# Patient Record
Sex: FEMALE | Race: BLACK OR AFRICAN AMERICAN | NOT HISPANIC OR LATINO | Employment: UNEMPLOYED | URBAN - METROPOLITAN AREA
[De-identification: names, ages, dates, MRNs, and addresses within clinical notes are randomized per-mention and may not be internally consistent; named-entity substitution may affect disease eponyms.]

---

## 2020-03-12 PROBLEM — I10 ESSENTIAL HYPERTENSION, BENIGN: Status: ACTIVE | Noted: 2020-03-12

## 2020-03-12 PROBLEM — F17.200 TOBACCO DEPENDENCE: Status: ACTIVE | Noted: 2020-03-12

## 2023-05-08 ENCOUNTER — HOSPITAL ENCOUNTER (OUTPATIENT)
Dept: PREADMISSION TESTING | Facility: HOSPITAL | Age: 48
Discharge: HOME | End: 2023-05-08
Attending: COLON & RECTAL SURGERY
Payer: COMMERCIAL

## 2023-05-08 DIAGNOSIS — Z12.11 SCREENING FOR COLON CANCER: ICD-10-CM

## 2023-11-20 PROBLEM — E87.6 HYPOKALEMIA: Status: ACTIVE | Noted: 2023-11-20

## 2023-12-18 ENCOUNTER — OFFICE VISIT (OUTPATIENT)
Dept: PODIATRY | Facility: CLINIC | Age: 48
End: 2023-12-18
Payer: COMMERCIAL

## 2023-12-18 VITALS — BODY MASS INDEX: 24.37 KG/M2 | WEIGHT: 129 LBS

## 2023-12-18 DIAGNOSIS — M79.672 LEFT FOOT PAIN: Primary | ICD-10-CM

## 2023-12-18 DIAGNOSIS — M21.612 BUNION OF GREAT TOE OF LEFT FOOT: ICD-10-CM

## 2023-12-18 PROCEDURE — 1160F PR REVIEW ALL MEDS BY PRESCRIBER/CLIN PHARMACIST DOCUMENTED: ICD-10-PCS | Mod: CPTII,S$GLB,, | Performed by: PODIATRIST

## 2023-12-18 PROCEDURE — 99999 PR PBB SHADOW E&M-EST. PATIENT-LVL III: ICD-10-PCS | Mod: PBBFAC,,, | Performed by: PODIATRIST

## 2023-12-18 PROCEDURE — 3061F PR NEG MICROALBUMINURIA RESULT DOCUMENTED/REVIEW: ICD-10-PCS | Mod: CPTII,S$GLB,, | Performed by: PODIATRIST

## 2023-12-18 PROCEDURE — 1160F RVW MEDS BY RX/DR IN RCRD: CPT | Mod: CPTII,S$GLB,, | Performed by: PODIATRIST

## 2023-12-18 PROCEDURE — 3008F PR BODY MASS INDEX (BMI) DOCUMENTED: ICD-10-PCS | Mod: CPTII,S$GLB,, | Performed by: PODIATRIST

## 2023-12-18 PROCEDURE — 1159F MED LIST DOCD IN RCRD: CPT | Mod: CPTII,S$GLB,, | Performed by: PODIATRIST

## 2023-12-18 PROCEDURE — 3066F NEPHROPATHY DOC TX: CPT | Mod: CPTII,S$GLB,, | Performed by: PODIATRIST

## 2023-12-18 PROCEDURE — 99999 PR PBB SHADOW E&M-EST. PATIENT-LVL III: CPT | Mod: PBBFAC,,, | Performed by: PODIATRIST

## 2023-12-18 PROCEDURE — 99203 PR OFFICE/OUTPT VISIT, NEW, LEVL III, 30-44 MIN: ICD-10-PCS | Mod: S$GLB,,, | Performed by: PODIATRIST

## 2023-12-18 PROCEDURE — 1159F PR MEDICATION LIST DOCUMENTED IN MEDICAL RECORD: ICD-10-PCS | Mod: CPTII,S$GLB,, | Performed by: PODIATRIST

## 2023-12-18 PROCEDURE — 3008F BODY MASS INDEX DOCD: CPT | Mod: CPTII,S$GLB,, | Performed by: PODIATRIST

## 2023-12-18 PROCEDURE — 3066F PR DOCUMENTATION OF TREATMENT FOR NEPHROPATHY: ICD-10-PCS | Mod: CPTII,S$GLB,, | Performed by: PODIATRIST

## 2023-12-18 PROCEDURE — 99203 OFFICE O/P NEW LOW 30 MIN: CPT | Mod: S$GLB,,, | Performed by: PODIATRIST

## 2023-12-18 PROCEDURE — 3061F NEG MICROALBUMINURIA REV: CPT | Mod: CPTII,S$GLB,, | Performed by: PODIATRIST

## 2023-12-18 NOTE — PROGRESS NOTES
Subjective:       Patient ID: Imelda Gooden is a 48 y.o. female.    Chief Complaint: Foot Pain (Left foot pain, pt was last seen by Adelia Ricardo MD at 2023, rate pain 5/10, nondiabetic, pt is wearing tennis shoes and socks)      HPI: Imelda Gooden presents to the office today with complaints of mild pains to the left foot at the great toe due to bunion deformity and the bottom of the left foot. Patient has not had injection therapy to the 1st MTPJ on the affected limb prior. Patient has had discomfort to the great toe for the past several months but reporting 0/10 pain today. Patient's Primary Care Provider is Adelia Buchanan MD.     Review of patient's allergies indicates:  No Known Allergies    History reviewed. No pertinent past medical history.    Family History   Problem Relation Age of Onset    Kidney disease Mother     Heart attack Mother     No Known Problems Father     No Known Problems Brother     No Known Problems Maternal Aunt     No Known Problems Maternal Uncle     No Known Problems Paternal Aunt     No Known Problems Paternal Uncle     No Known Problems Maternal Grandmother     No Known Problems Maternal Grandfather     No Known Problems Paternal Grandmother        Social History     Socioeconomic History    Marital status: Single   Tobacco Use    Smoking status: Some Days    Smokeless tobacco: Current   Substance and Sexual Activity    Alcohol use: Yes    Drug use: Never    Sexual activity: Yes       Past Surgical History:   Procedure Laterality Date     SECTION      3 c-sections    OOPHORECTOMY         Review of Systems    Objective:   Wt 58.5 kg (129 lb)   LMP 2023 (Approximate)   BMI 24.37 kg/m²     No image results found.       Physical Exam  LOWER EXTREMITY PHYSICAL EXAMINATION    VASCULAR: On the left foot, the dorsalis pedis pulse is 2/4 and the posterior tibial pulse is 2/4. Capillary refill time is less than 3 seconds. Hair growth is  present on the dorsum of the foot and at the digits. Proximal to distal temperature is warm to warm    ORTHOPEDIC: Mild bunion deformity is noted to the left foot. Upon ROM in the sagittal plan, there is minimal pain related at the end ROM, dorsally; no plantar pains at the 1st MTPJ are stated. No pains to palpation of the tibial or the fibular sesamoid. There is a moderate medial eminence appreciated. There is no dorsal spurring noted. Pain sub 3rd and 4th metatarsal head.     DERMATOLOGY: Skin is supple, dry and intact. No ecchymosis is noted. No ulcerations are noted. Skin is supple and moist.     NEUROLOGY: Protective sensation is intact via 5.07 Hartline Masood monofilament. Proprioception is intact. Sensation to light touch is intact.     Assessment:     1. Left foot pain    2. Bunion of great toe of left foot        Plan:     Left foot pain  -     Ambulatory referral/consult to Podiatry    Bunion of great toe of left foot      Thorough discussion is had with the patient today, concerning the diagnosis, its etiology, and the treatment algorithm at present.    Patient did have previous x-rays perform an outside clinic.  Unable to evaluate the x-ray imaging, however the results were reviewed to show no acute fracture, dislocation, arthritis.    We discussed utilization of wide toe box shoe gear to help provide adequate room in spacing to the left foot to decrease risk of pain associated with the medial eminence as well as potential neuroma to the plantar aspect the left foot.  As patient is having little to no pain today, would recommend as she continues to function and ambulate as tolerated.  Would not recommend surgical intervention if it is not hurting or causing any issues.      No future appointments.

## 2025-04-10 ENCOUNTER — OFFICE VISIT (OUTPATIENT)
Dept: OBSTETRICS AND GYNECOLOGY | Facility: CLINIC | Age: 50
End: 2025-04-10
Payer: COMMERCIAL

## 2025-04-10 ENCOUNTER — LAB VISIT (OUTPATIENT)
Dept: LAB | Facility: HOSPITAL | Age: 50
End: 2025-04-10
Payer: COMMERCIAL

## 2025-04-10 VITALS — BODY MASS INDEX: 26.19 KG/M2 | WEIGHT: 138.69 LBS | HEIGHT: 61 IN

## 2025-04-10 DIAGNOSIS — Z01.419 ENCOUNTER FOR ANNUAL ROUTINE GYNECOLOGICAL EXAMINATION: ICD-10-CM

## 2025-04-10 DIAGNOSIS — Z12.4 CERVICAL CANCER SCREENING: ICD-10-CM

## 2025-04-10 DIAGNOSIS — N95.0 PMB (POSTMENOPAUSAL BLEEDING): ICD-10-CM

## 2025-04-10 LAB
FSH SERPL-ACNC: 113.38 MIU/ML
TSH SERPL-ACNC: 0.65 UIU/ML (ref 0.4–4)

## 2025-04-10 PROCEDURE — 84443 ASSAY THYROID STIM HORMONE: CPT

## 2025-04-10 PROCEDURE — 85025 COMPLETE CBC W/AUTO DIFF WBC: CPT

## 2025-04-10 PROCEDURE — 87624 HPV HI-RISK TYP POOLED RSLT: CPT

## 2025-04-10 PROCEDURE — 88175 CYTOPATH C/V AUTO FLUID REDO: CPT

## 2025-04-10 PROCEDURE — 36415 COLL VENOUS BLD VENIPUNCTURE: CPT

## 2025-04-10 PROCEDURE — 99999 PR PBB SHADOW E&M-EST. PATIENT-LVL III: CPT | Mod: PBBFAC,,,

## 2025-04-10 PROCEDURE — 83001 ASSAY OF GONADOTROPIN (FSH): CPT

## 2025-04-10 RX ORDER — OLMESARTAN MEDOXOMIL AND HYDROCHLOROTHIAZIDE 20/12.5 20; 12.5 MG/1; MG/1
1 TABLET ORAL DAILY
COMMUNITY
Start: 2025-04-04 | End: 2025-07-03

## 2025-04-10 NOTE — PROGRESS NOTES
Subjective:       Patient ID: Imelda Gooden is a 50 y.o. female.    Chief Complaint:  Annual Exam      History of Present Illness  HPI  Annual Exam-Postmenopausal  Patient presents for annual exam. The patient has complaints today of vaginal bleeding. The patient is not currently sexually active, no intercourse in 2 years. GYN screening history: last pap: approximate date 3/25/24 and was normal and last mammogram: approximate date 2024 and was normal. The patient has never been taking hormone replacement therapy. Patient reports post-menopausal vaginal bleeding. Vaginal bleeding has been going on for 1 week. Patient feels bleeding is coming from vagina. Bleeding is not associated with pain. Previous work up: none. The patient wears seatbelts: yes. The patient participates in regular exercise: no. Has the patient ever been transfused or tattooed?: yes. The patient reports that there is not domestic violence in her life.    Patient has been menopausal for 2 years, she reports an episode of spotting from 3/31-4/3, only requiring a panty-liner and she mostly saw bleeding when she wiped. Denies any other vaginal symptoms.         GYN & OB History  No LMP recorded (lmp unknown). Patient is postmenopausal.   Date of Last Pap: 3/25/2024    OB History    Para Term  AB Living   3 3 3   3   SAB IAB Ectopic Multiple Live Births       3      # Outcome Date GA Lbr Domo/2nd Weight Sex Type Anes PTL Lv   3 Term      CS-LTranv   KRISTOFER   2 Term      CS-LTranv   KRISTOFER   1 Term      CS-LTranv   KRISTOFER       Review of Systems  Review of Systems   Constitutional:  Negative for appetite change, fatigue, fever and unexpected weight change.   Eyes:  Negative for visual disturbance.   Cardiovascular:  Negative for chest pain.   Gastrointestinal:  Negative for abdominal pain, bloating, constipation, diarrhea, nausea and vomiting.   Genitourinary:  Positive for postmenopausal bleeding and vaginal dryness. Negative for bladder  incontinence, decreased libido, dysmenorrhea, dyspareunia, dysuria, flank pain, frequency, genital sores, hot flashes, menorrhagia, menstrual problem, pelvic pain, urgency, vaginal bleeding, vaginal discharge, vaginal pain, postcoital bleeding and vaginal odor.   Integumentary:  Negative for rash, acne, mole/lesion, breast mass, nipple discharge, breast skin changes and breast tenderness.   Neurological:  Negative for syncope, headaches and memory loss.   Hematological:  Negative for adenopathy. Does not bruise/bleed easily.   Psychiatric/Behavioral:  Negative for sleep disturbance.    All other systems reviewed and are negative.  Breast: Positive for breast self exam.Negative for asymmetry, lump, mass, nipple discharge, skin changes and tenderness          Objective:      Physical Exam:   Constitutional: She is oriented to person, place, and time. She appears well-developed and well-nourished.    HENT:   Head: Normocephalic and atraumatic.   Nose: Nose normal.    Eyes: Pupils are equal, round, and reactive to light. Conjunctivae and EOM are normal.     Cardiovascular:  Normal rate and regular rhythm.             Pulmonary/Chest: Effort normal. She has no decreased breath sounds. She has no rhonchi. Right breast exhibits no inverted nipple, no mass, no nipple discharge, no tenderness and no bleeding. Left breast exhibits no inverted nipple, no mass, no nipple discharge, no tenderness and no bleeding. Breasts are symmetrical.        Abdominal: Soft. There is no abdominal tenderness.     Genitourinary:    Inguinal canal, uterus, right adnexa and left adnexa normal.      Pelvic exam was performed with patient supine.   The external female genitalia was normal.   No external genitalia lesions identified,Genitalia hair distrobution normal .     Labial bartholins normal.Cervix is normal. Right adnexum displays no mass and no tenderness. Left adnexum displays no mass and no tenderness. No vaginal discharge, tenderness or  bleeding in the vagina. Vaginal atrophy noted. Cervix exhibits no motion tenderness, no discharge and no tenderness.    pap smear completedUerus contour normal  Uterus is not tender. Uterus size: 8 cm.Normal urethral meatus.   Genitourinary Comments: No bleeding noted during exam              Musculoskeletal: Normal range of motion and moves all extremeties.       Neurological: She is alert and oriented to person, place, and time.    Skin: Skin is warm and dry.    Psychiatric: She has a normal mood and affect. Her speech is normal and behavior is normal. Mood, judgment and thought content normal.             Assessment:        1. Encounter for annual routine gynecological examination    2. Cervical cancer screening    3. PMB (postmenopausal bleeding)               Plan:   Continue annual well woman exam.    Diagnosis and orders this visit:  Encounter for annual routine gynecological examination    Cervical cancer screening  -     Liquid-Based Pap Smear, Screening   - Pap collected. Patient was counseled today on [facility]P screening guidelines (pap smear every 3 years in low risk patients) and recommendations for annual pelvic exams and clinical breast exams, yearly mammograms; and to see her PCP for other healthcare maintenance.      PMB (postmenopausal bleeding)  -     CBC Auto Differential; Future; Expected date: 04/10/2025  -     Follicle Stimulating Hormone; Future; Expected date: 04/10/2025  -     TSH; Future; Expected date: 04/10/2025  -     US Pelvis Comp with Transvag NON-OB (xpd; Future; Expected date: 04/10/2025  -     C. trachomatis/N. gonorrhoeae by AMP DNA     - Postmenopausal bleeding  Possible etiologies include atrophy, intrauterine lesions such as fibroids/endometrial polyps, endometrial hyperplasia, or malignancy  Will obtain TVUS to assess for intracavitary lesions.   If endometrial stripe is less than or equal to 4mm, this has a negative predictive value of greater than 99% for endometrial cancer. If  bleeding is recurrent or persistent, endometrial biopsy should be performed  If endometrial stripe is greater than 4mm, then recommend endometrial biopsy  Endometrial biopsy accepted today, but unable to collect due to cervical stenosis  If biopsy results benign, then can consider expectant management  Medical management if bleeding persists including daily Provera 10 mg or Liletta IUD insertion  In addition, if bleeding persists, consider surgical management options include D&C in the OR for better endometrial sampling or hysterectomy for definitive treatment  Consider other sources of bleeding such as GI, , cervical, vaginal, vulvar         Mouna Castro, NP

## 2025-04-11 LAB
ABSOLUTE EOSINOPHIL (OHS): 0.05 K/UL
ABSOLUTE MONOCYTE (OHS): 0.52 K/UL (ref 0.3–1)
ABSOLUTE NEUTROPHIL COUNT (OHS): 4.67 K/UL (ref 1.8–7.7)
BASOPHILS # BLD AUTO: 0.02 K/UL
BASOPHILS NFR BLD AUTO: 0.3 %
ERYTHROCYTE [DISTWIDTH] IN BLOOD BY AUTOMATED COUNT: 13.6 % (ref 11.5–14.5)
HCT VFR BLD AUTO: 40.3 % (ref 37–48.5)
HGB BLD-MCNC: 12.4 GM/DL (ref 12–16)
IMM GRANULOCYTES # BLD AUTO: 0.01 K/UL (ref 0–0.04)
IMM GRANULOCYTES NFR BLD AUTO: 0.1 % (ref 0–0.5)
LYMPHOCYTES # BLD AUTO: 2.35 K/UL (ref 1–4.8)
MCH RBC QN AUTO: 28.8 PG (ref 27–31)
MCHC RBC AUTO-ENTMCNC: 30.8 G/DL (ref 32–36)
MCV RBC AUTO: 94 FL (ref 82–98)
NUCLEATED RBC (/100WBC) (OHS): 0 /100 WBC
PLATELET # BLD AUTO: 258 K/UL (ref 150–450)
PMV BLD AUTO: 10 FL (ref 9.2–12.9)
RBC # BLD AUTO: 4.3 M/UL (ref 4–5.4)
RELATIVE EOSINOPHIL (OHS): 0.7 %
RELATIVE LYMPHOCYTE (OHS): 30.8 % (ref 18–48)
RELATIVE MONOCYTE (OHS): 6.8 % (ref 4–15)
RELATIVE NEUTROPHIL (OHS): 61.3 % (ref 38–73)
WBC # BLD AUTO: 7.62 K/UL (ref 3.9–12.7)

## 2025-04-11 NOTE — PROCEDURES
Endometrial biopsy    Date/Time: 4/10/2025 1:00 PM    Performed by: Mouna Castro NP  Authorized by: Mouna Castro NP    Consent:     Prior to procedure the appropriate consent was completed and verified      Consent given by:  Patient    Patient questions answered: yes      Patient agrees, verbalizes understanding, and wants to proceed: yes      Educational handouts given: no      Instructions and paperwork completed: no    Indication:     Indications: Post-menopausal bleeding      Chronicity of post-menopausal bleeding:  New    Progression of post-menopausal bleeding:  Resolved  Pre-procedure:     Pre-procedure timeout performed: yes    Procedure:     Procedure: endometrial biopsy with Pipelle      Cervix cleaned and prepped: no      A paracervical block was performed: no      An intracervical block was performed: no      The cervix was dilated using cervical dilators: no      Use of single-tooth tenaculum: yes      Uterus sounded: no      Patient tolerated procedure well with no complications: yes      Unable to perform due to: cervical stenosis    Comments:     Procedure comments:  Will proceed with pelvic US

## 2025-04-21 ENCOUNTER — RESULTS FOLLOW-UP (OUTPATIENT)
Dept: OBSTETRICS AND GYNECOLOGY | Facility: CLINIC | Age: 50
End: 2025-04-21